# Patient Record
Sex: MALE | Race: BLACK OR AFRICAN AMERICAN
[De-identification: names, ages, dates, MRNs, and addresses within clinical notes are randomized per-mention and may not be internally consistent; named-entity substitution may affect disease eponyms.]

---

## 2023-06-22 PROBLEM — Z00.00 ENCOUNTER FOR PREVENTIVE HEALTH EXAMINATION: Status: ACTIVE | Noted: 2023-06-22

## 2023-07-12 ENCOUNTER — APPOINTMENT (OUTPATIENT)
Dept: CARDIOLOGY | Facility: CLINIC | Age: 23
End: 2023-07-12
Payer: MEDICAID

## 2023-07-12 ENCOUNTER — NON-APPOINTMENT (OUTPATIENT)
Age: 23
End: 2023-07-12

## 2023-07-12 VITALS
OXYGEN SATURATION: 100 % | WEIGHT: 162.13 LBS | BODY MASS INDEX: 23.21 KG/M2 | HEIGHT: 70 IN | DIASTOLIC BLOOD PRESSURE: 80 MMHG | RESPIRATION RATE: 16 BRPM | HEART RATE: 87 BPM | SYSTOLIC BLOOD PRESSURE: 130 MMHG

## 2023-07-12 DIAGNOSIS — Z78.9 OTHER SPECIFIED HEALTH STATUS: ICD-10-CM

## 2023-07-12 PROCEDURE — 93000 ELECTROCARDIOGRAM COMPLETE: CPT

## 2023-07-12 PROCEDURE — 99214 OFFICE O/P EST MOD 30 MIN: CPT | Mod: 25

## 2023-07-12 NOTE — HISTORY OF PRESENT ILLNESS
[FreeTextEntry1] : Patient presents to the office today stating that for the last 3 months or so he has been having issues with shortness of breath.  This has been occurring with and without exertion.  Sometimes he will just be sitting around and it will happen and sometimes it happens when he is exerting himself.  It does not happen consistently even when he exerts himself.  There are no other clear aggravating factors to his shortness of breath.  He does also report issues palpitations that occur only when he exerts himself.  He does not have them at other times.  This is sometimes associated with shortness of breath and sometimes not.  No chest pain at all.  No other specific physical symptoms.  He did have typhoid fever a year ago but seemed to recover from that well.  Patient denies orthopnea, presyncope, syncope.

## 2023-07-12 NOTE — ASSESSMENT
[FreeTextEntry1] : EKG: Sinus rhythm with no significant ST or T wave changes.\par \par 22-year-old male with no significant past medical history presents me for evaluation of recent shortness of breath and palpitations.  The patient symptoms overall with regards to shortness of breath seem less likely to be cardiac.  I have recommended a pulmonary evaluation but will rule out a cardiac cause.  He does have some exertional shortness of breath.  He also reports palpitations only with exertion I will evaluate that further with stress testing as well as a monitor.  EKG today is unremarkable.  Blood pressure appears to be controlled.  Recent blood work shows some elevation in his LDL but nothing in need of treatment.  Blood work is otherwise unremarkable.  Chest x-ray was normal.

## 2023-07-12 NOTE — DISCUSSION/SUMMARY
[FreeTextEntry1] : 1.  Check echocardiogram and exercise stress test to further evaluate his shortness of breath as well as his palpitations on exertion.\par 2.  Check 1 week Holter monitor to evaluate his palpitations as well.\par 3.  No additional cardiac medications at this time.\par 4.  Patient encouraged to work on a healthy diet high in lean protein, whole grains and vegetables, and lower in white flour and simple sugars.\par 5.  Recommend pulmonary evaluation.\par 6.  Follow up here after testing, and will make further recommendations at that time. [EKG obtained to assist in diagnosis and management of assessed problem(s)] : EKG obtained to assist in diagnosis and management of assessed problem(s)

## 2023-08-02 ENCOUNTER — APPOINTMENT (OUTPATIENT)
Dept: CARDIOLOGY | Facility: CLINIC | Age: 23
End: 2023-08-02
Payer: MEDICAID

## 2023-08-02 PROCEDURE — 93306 TTE W/DOPPLER COMPLETE: CPT

## 2023-08-14 ENCOUNTER — APPOINTMENT (OUTPATIENT)
Dept: CARDIOLOGY | Facility: CLINIC | Age: 23
End: 2023-08-14
Payer: MEDICAID

## 2023-08-14 PROCEDURE — 93015 CV STRESS TEST SUPVJ I&R: CPT

## 2023-08-31 ENCOUNTER — APPOINTMENT (OUTPATIENT)
Dept: CARDIOLOGY | Facility: CLINIC | Age: 23
End: 2023-08-31
Payer: MEDICAID

## 2023-08-31 VITALS
WEIGHT: 165 LBS | BODY MASS INDEX: 23.62 KG/M2 | HEIGHT: 70 IN | HEART RATE: 91 BPM | RESPIRATION RATE: 16 BRPM | DIASTOLIC BLOOD PRESSURE: 83 MMHG | SYSTOLIC BLOOD PRESSURE: 117 MMHG | OXYGEN SATURATION: 94 %

## 2023-08-31 PROCEDURE — 99213 OFFICE O/P EST LOW 20 MIN: CPT

## 2023-08-31 NOTE — ASSESSMENT
[FreeTextEntry1] : Echocardiogram August 2, 2023 demonstrated left ventricle normal size with low normal function and ejection fraction of 50 to 55%.  Trace mitral, mild tricuspid and mild to moderate pulmonic insufficiency noted.  Exercise stress test August 14, 2023 during which the patient exercised via a Ambrosio protocol for 9 minutes, totaling 10 METS.  Peak heart rate achieved was 164 bpm, representing 83% of age-predicted maximum.  Blood pressure response to exercise was normal.  EKG showed no evidence of ischemia with exercise at slightly submaximal heart rate.  22-year-old male with no significant past medical history who presented to me for evaluation of recent shortness of breath and palpitations.  Patient symptoms have improved somewhat, especially his palpitations.  Shortness of breath is still somewhat of an issue.  There is no evidence from a cardiac perspective of a cause for his shortness of breath.  I have recommended pulmonary evaluation.  Echo shows a low normal EF and mild valve disease.  This is likely from high vagal tone and does not appear to be pathological.  Stress testing showed no evidence of ischemia even with a reduced exercise capacity due to his shortness of breath which may in fact be related to asthma or another lung disease.  No additional cardiac testing is needed at this time.

## 2023-08-31 NOTE — DISCUSSION/SUMMARY
[FreeTextEntry1] : 1.  Check 1 week Holter monitor to evaluate his palpitations.  No additional cardiac testing at this time. 2.  No additional cardiac medications at this time. 3.  Patient is encouraged to exercise at least 30 minutes a day every day of the week. 4.  Patient encouraged to work on a healthy diet high in lean protein, whole grains and vegetables, and lower in white flour and simple sugars. 5.  Recommend pulmonary evaluation. 6.  Follow up here in 3 months.

## 2023-08-31 NOTE — HISTORY OF PRESENT ILLNESS
[FreeTextEntry1] : Patient returns the office today noting his shortness of breath has improved somewhat although it is still an issue.  His palpitations have improved a lot and are very minimal at this time.  He was unable to get the monitor completed because he never received it.  He reports no other new symptoms at this time.  Patient denies chest pain, orthopnea, presyncope, syncope.

## 2023-10-30 ENCOUNTER — NON-APPOINTMENT (OUTPATIENT)
Age: 23
End: 2023-10-30

## 2023-10-30 ENCOUNTER — APPOINTMENT (OUTPATIENT)
Dept: CARDIOLOGY | Facility: CLINIC | Age: 23
End: 2023-10-30
Payer: MEDICAID

## 2023-10-30 VITALS
SYSTOLIC BLOOD PRESSURE: 134 MMHG | RESPIRATION RATE: 16 BRPM | DIASTOLIC BLOOD PRESSURE: 79 MMHG | BODY MASS INDEX: 21.19 KG/M2 | WEIGHT: 148 LBS | HEART RATE: 89 BPM | HEIGHT: 70 IN

## 2023-10-30 PROCEDURE — 99213 OFFICE O/P EST LOW 20 MIN: CPT | Mod: 25

## 2023-10-30 PROCEDURE — 93000 ELECTROCARDIOGRAM COMPLETE: CPT

## 2024-02-21 ENCOUNTER — APPOINTMENT (OUTPATIENT)
Dept: CARDIOLOGY | Facility: CLINIC | Age: 24
End: 2024-02-21
Payer: MEDICAID

## 2024-02-21 ENCOUNTER — NON-APPOINTMENT (OUTPATIENT)
Age: 24
End: 2024-02-21

## 2024-02-21 VITALS
WEIGHT: 149 LBS | HEART RATE: 98 BPM | RESPIRATION RATE: 16 BRPM | HEIGHT: 71 IN | DIASTOLIC BLOOD PRESSURE: 76 MMHG | SYSTOLIC BLOOD PRESSURE: 120 MMHG | BODY MASS INDEX: 20.86 KG/M2

## 2024-02-21 DIAGNOSIS — F41.9 ANXIETY DISORDER, UNSPECIFIED: ICD-10-CM

## 2024-02-21 DIAGNOSIS — R00.2 PALPITATIONS: ICD-10-CM

## 2024-02-21 PROCEDURE — 93000 ELECTROCARDIOGRAM COMPLETE: CPT

## 2024-02-21 PROCEDURE — 99214 OFFICE O/P EST MOD 30 MIN: CPT

## 2024-02-21 NOTE — HISTORY OF PRESENT ILLNESS
[FreeTextEntry1] : Patient presents back to the office today reporting his shortness of breath symptoms and palpitations have returned these last few weeks.;;;;;;;;;;;;;;;;;;;;;;;;;;;;;;;;;;;;;;  He reports no further issues with shortness of breath or palpitations at all.  He reports no physical symptoms.  He completed a Holter monitor but never returned.  Patient denies chest pain, orthopnea, presyncope, syncope.

## 2024-02-21 NOTE — DISCUSSION/SUMMARY
[FreeTextEntry1] : 1. Follow-up on results of 1 week Holter monitor.  No additional cardiac testing at this time. 2.  No additional cardiac medications at this time. 3.  Patient is encouraged to exercise at least 30 minutes a day every day of the week. 4.  Patient encouraged to work on a healthy diet high in lean protein, whole grains and vegetables, and lower in white flour and simple sugars. 5.  Consider pulmonary evaluation. 6.  I will discuss the results of the Holter monitor with the patient over the phone.  If this is unremarkable no additional cardiac work-up or treatment, or routine follow-up is needed at this time.  I will be available as needed.

## 2024-02-21 NOTE — ASSESSMENT
[FreeTextEntry1] : Echocardiogram August 2, 2023 demonstrated left ventricle normal size with low normal function and ejection fraction of 50 to 55%. Trace mitral, mild tricuspid and mild to moderate pulmonic insufficiency noted.  Exercise stress test August 14, 2023 during which the patient exercised via a Ambrosio protocol for 9 minutes, totaling 10 METS. Peak heart rate achieved was 164 bpm, representing 83% of age-predicted maximum. Blood pressure response to exercise was normal. EKG showed no evidence of ischemia with exercise at slightly submaximal heart rate.  EKG: Sinus rhythm with no significant ST or T wave changes.  23-year-old male with no significant past medical history who presented to me recently for evaluation of recent shortness of breath and palpitations.  Patient symptoms have improved and he is essentially asymptomatic at this time.  He wore the Holter monitor but has not yet returned it so I do not have any data.  However assuming that there is nothing significant on the Holter there is no need for additional cardiac work-up or treatment at this time.  He never saw pulmonary but given the lack of further symptoms this may not be necessary.  It is still something he could consider.  I discussed the results of the Holter over the phone once I have it but there is otherwise no need for routine cardiac follow-up at this time.

## 2024-03-18 ENCOUNTER — APPOINTMENT (OUTPATIENT)
Dept: INTERNAL MEDICINE | Facility: CLINIC | Age: 24
End: 2024-03-18

## 2024-05-22 ENCOUNTER — APPOINTMENT (OUTPATIENT)
Dept: PULMONOLOGY | Facility: CLINIC | Age: 24
End: 2024-05-22
Payer: MEDICAID

## 2024-05-22 VITALS — HEART RATE: 104 BPM

## 2024-05-22 VITALS
HEART RATE: 116 BPM | DIASTOLIC BLOOD PRESSURE: 68 MMHG | RESPIRATION RATE: 16 BRPM | HEIGHT: 70.5 IN | SYSTOLIC BLOOD PRESSURE: 122 MMHG | OXYGEN SATURATION: 98 % | WEIGHT: 150 LBS | BODY MASS INDEX: 21.24 KG/M2

## 2024-05-22 DIAGNOSIS — R06.02 SHORTNESS OF BREATH: ICD-10-CM

## 2024-05-22 DIAGNOSIS — Z78.9 OTHER SPECIFIED HEALTH STATUS: ICD-10-CM

## 2024-05-22 PROCEDURE — 94010 BREATHING CAPACITY TEST: CPT

## 2024-05-22 PROCEDURE — 99204 OFFICE O/P NEW MOD 45 MIN: CPT | Mod: 25

## 2024-05-22 NOTE — PROCEDURE
[FreeTextEntry1] : Exercise stress test August 14, 2023 during which the patient exercised via a Ambrosio protocol for 9 minutes, totaling 10 METS. Peak heart rate achieved was 164 bpm, representing 83% of age-predicted maximum. Blood pressure response to exercise was normal. EKG showed no evidence of ischemia with exercise at slightly submaximal heart rate.  1 week Holter monitor (worn for 1 day) on August 31, 2023: There were no arrhythmias, pauses, or AV blocks noted. There were runs of sinus tachycardia in the high 100s to low 200s (patient reports exercising at gym during this time).  CXR 6/23 neg echo normal LV/RV no PH Spirometry and FV loop are normal

## 2024-05-22 NOTE — CONSULT LETTER
[Dear  ___] : Dear  [unfilled], [Consult Letter:] : I had the pleasure of evaluating your patient, [unfilled]. [Please see my note below.] : Please see my note below. [Sincerely,] : Sincerely, [FreeTextEntry3] : Evans Mnoroy DO [facility] Director Pulmonary Division Medical Director Respiratory Therapy [facility]

## 2024-05-22 NOTE — DISCUSSION/SUMMARY
[FreeTextEntry1] : Episodic dyspnea x greater than 1 yr No definite trigger, no vaping, irritant fume or Env exposure No systemic complaints symptoms start and relieve on own Cardiac work up reviewed, negative Lung exam is normal, normal sp02 Spirometry and f/v loop are normal CXR 6/23 negative, will repeat D dimer suspicion low Per pt labs from PMD are normal Follow up post, advised ER/urgent care if episodes recur for real time eval

## 2024-05-22 NOTE — HISTORY OF PRESENT ILLNESS
[Never] : never [TextBox_4] : has SOB started in Apil 2023 Episodes of SOB,  4 x month Had episode yesterday lasted 5 hours- now resolved no definite trigger, resolves on own in few minutes No hx asthma had cold last week, now better denies vaping or smoking works as [employer] x 1 yr [employer]  Cardiology work up noted- negative

## 2024-08-29 ENCOUNTER — APPOINTMENT (OUTPATIENT)
Dept: PULMONOLOGY | Facility: CLINIC | Age: 24
End: 2024-08-29